# Patient Record
Sex: MALE | Race: WHITE | Employment: OTHER | URBAN - METROPOLITAN AREA
[De-identification: names, ages, dates, MRNs, and addresses within clinical notes are randomized per-mention and may not be internally consistent; named-entity substitution may affect disease eponyms.]

---

## 2021-04-01 ENCOUNTER — APPOINTMENT (OUTPATIENT)
Dept: RADIATION ONCOLOGY | Facility: HOSPITAL | Age: 66
End: 2021-04-01
Attending: INTERNAL MEDICINE
Payer: MEDICARE

## 2021-05-04 ENCOUNTER — TELEPHONE (OUTPATIENT)
Dept: HEMATOLOGY/ONCOLOGY | Facility: HOSPITAL | Age: 66
End: 2021-05-04

## 2021-05-05 ENCOUNTER — VIRTUAL PHONE E/M (OUTPATIENT)
Dept: RADIATION ONCOLOGY | Facility: HOSPITAL | Age: 66
End: 2021-05-05
Attending: INTERNAL MEDICINE
Payer: MEDICARE

## 2021-05-05 DIAGNOSIS — C61 PROSTATE CANCER (HCC): Primary | ICD-10-CM

## 2021-05-05 NOTE — PROGRESS NOTES
Luz Maria Mcrae verbally consents to a Virtual/Telephone Check-In service on 05/05/21. Duration of Service:  60 minutes    Patient has been referred to the [facility] website at [email or web address] to review the yearly Consent to Treat document.     Patient Medical History  -HTN  -HLD  -GERD  -intersititial lung disease    Past Surgical History  -Tonsillectomy (1950s)  -R inguinal hernia repair (2016)  -Lumbar epidural injection     Medications  -albuterol  -vitamin C  -Excedrin migraine  -atorvastatin  -Symb with the patient. I called one of his local hospitals in [place] where they do offer prostate SBRT. He will schedule a consultation closer to home. Will also consider hypofractionated EBRT.      Kristine Lance MD  Radiation Oncology    I spent 60 minute